# Patient Record
Sex: MALE | Race: WHITE | NOT HISPANIC OR LATINO | URBAN - METROPOLITAN AREA
[De-identification: names, ages, dates, MRNs, and addresses within clinical notes are randomized per-mention and may not be internally consistent; named-entity substitution may affect disease eponyms.]

---

## 2022-02-07 ENCOUNTER — APPOINTMENT (RX ONLY)
Dept: URBAN - METROPOLITAN AREA CLINIC 37 | Facility: CLINIC | Age: 42
Setting detail: DERMATOLOGY
End: 2022-02-07

## 2022-02-07 DIAGNOSIS — L72.8 OTHER FOLLICULAR CYSTS OF THE SKIN AND SUBCUTANEOUS TISSUE: ICD-10-CM

## 2022-02-07 DIAGNOSIS — D22 MELANOCYTIC NEVI: ICD-10-CM

## 2022-02-07 PROBLEM — D22.9 MELANOCYTIC NEVI, UNSPECIFIED: Status: ACTIVE | Noted: 2022-02-07

## 2022-02-07 PROCEDURE — ? ADDITIONAL NOTES

## 2022-02-07 PROCEDURE — ? TREATMENT REGIMEN

## 2022-02-07 PROCEDURE — ? COUNSELING

## 2022-02-07 PROCEDURE — 99203 OFFICE O/P NEW LOW 30 MIN: CPT

## 2022-02-07 ASSESSMENT — LOCATION DETAILED DESCRIPTION DERM: LOCATION DETAILED: INFERIOR THORACIC SPINE

## 2022-02-07 ASSESSMENT — LOCATION SIMPLE DESCRIPTION DERM: LOCATION SIMPLE: UPPER BACK

## 2022-02-07 ASSESSMENT — LOCATION ZONE DERM: LOCATION ZONE: TRUNK

## 2022-02-07 NOTE — PROCEDURE: COUNSELING
30 tablet 2    Biotin (BIOTIN 5000) 5 MG CAPS Take by mouth      SITagliptin (JANUVIA) 50 MG tablet Take 1 tablet by mouth daily 30 tablet 5    SITagliptin (JANUVIA) 50 MG tablet Take 1 tablet by mouth daily 30 tablet 3    glipiZIDE (GLUCOTROL) 5 MG tablet ONE HALF TAB BID  BREAKFAST AND SUPPER 30 tablet 5    loratadine (CLARITIN) 10 MG tablet Take 1 tablet by mouth daily 30 tablet 3    beclomethasone (QVAR) 80 MCG/ACT inhaler Inhale 1 puff into the lungs 2 times daily 1 Inhaler 3    albuterol sulfate  (90 Base) MCG/ACT inhaler Inhale 2 puffs into the lungs every 6 hours as needed for Wheezing 1 Inhaler 3    metoprolol tartrate (LOPRESSOR) 50 MG tablet TAKE 1 & 1/2 TABLETS BY MOUTH TWICE DAILY 90 tablet 4    fluticasone (FLONASE) 50 MCG/ACT nasal spray 1 spray by Each Nostril route daily 1 Bottle 0    menthol-cetylpyridinium (CEPACOL REGULAR STRENGTH) 3 MG lozenge Take 1 lozenge by mouth as needed for Sore Throat 30 lozenge 2    Incontinence Supply Disposable (DISPOSABLE BRIEF LARGE) MISC Use as directed 3-4 times /day 120 Bottle 11    acetaminophen (TYLENOL) 325 MG tablet Take 2 tablets by mouth every 6 hours as needed for Pain 40 tablet 3    blood glucose test strips (TRUE METRIX BLOOD GLUCOSE TEST) strip 1 each by In Vitro route 2 times daily As needed. 100 each 5    vitamin D (ERGOCALCIFEROL) 14144 units CAPS capsule Take 1 capsule by mouth once a week 4 capsule 5    Alcohol Swabs (ALCOHOL WIPES) 70 % PADS Use up to 4 times a day when testing blood sugars 100 each 5    Lancets MISC 1 each by Does not apply route daily Use 4 times daily Diagnisis:250.0  Diabetes Mellitus 100 each 11    Multiple Vitamins-Minerals (THERAPEUTIC MULTIVITAMIN-MINERALS) tablet Take 1 tablet by mouth daily 30 tablet 11    nystatin (MYCOSTATIN) 404101 UNIT/GM cream Apply topically 2 times daily. 1 Tube 0     No current facility-administered medications for this visit.         Patient Care Team:  Alana Cortez MD as PCP - General (Internal Medicine)  Ofelia Wang MD as PCP - [facility] Empaneled Provider    PAST MEDICAL AND SURGICAL HISTORY:      Past Medical History:   Diagnosis Date    Anxiety     Asthma     CAD (coronary artery disease)     Depression     GERD (gastroesophageal reflux disease)     Hyperlipidemia     Hypertension     MI (myocardial infarction) ([address]     Obesity     Osteoarthritis     left knee    Sinus tarsi syndrome     Thyroid cyst     Type II or unspecified type diabetes mellitus without mention of complication, not stated as uncontrolled     Urinary incontinence     Vertigo      Past Surgical History:   Procedure Laterality Date    BREAST CYST EXCISION      HYSTERECTOMY         SOCIAL HISTORY      Social History     Tobacco Use    Smoking status: Never Smoker    Smokeless tobacco: Never Used   Substance Use Topics    Alcohol use: No     Alcohol/week: 0.0 standard drinks     Shantanu Berry  reports that she has never smoked. She has never used smokeless tobacco.    FAMILY HISTORY:      Family History   Problem Relation Age of Onset    High Blood Pressure Mother     Seizures Mother     Heart Disease Father        REVIEW OF SYSTEMS:    Review of Systems   Constitutional: Positive for fatigue. HENT: Negative for postnasal drip. Eyes: Negative for redness. Respiratory: Positive for cough. Cardiovascular: Negative. Gastrointestinal: Negative. Endocrine: Negative. Genitourinary: Negative. Musculoskeletal: Positive for arthralgias. Skin: Negative. Allergic/Immunologic: Negative. Neurological: Negative. Hematological: Negative. Psychiatric/Behavioral: Negative.           PHYSICAL EXAM:      Vitals:    09/26/19 1031   BP: 123/75   Pulse: 84     BP Readings from Last 3 Encounters:   09/26/19 123/75   08/07/19 (!) 150/74   05/24/19 131/84       Physical Examination: General appearance - alert, well appearing, and in no distress  Mental status - alert, oriented to Detail Level: Detailed